# Patient Record
Sex: MALE | Race: BLACK OR AFRICAN AMERICAN | NOT HISPANIC OR LATINO | Employment: FULL TIME | URBAN - METROPOLITAN AREA
[De-identification: names, ages, dates, MRNs, and addresses within clinical notes are randomized per-mention and may not be internally consistent; named-entity substitution may affect disease eponyms.]

---

## 2022-04-19 ENCOUNTER — OFFICE VISIT (OUTPATIENT)
Dept: FAMILY MEDICINE CLINIC | Facility: CLINIC | Age: 47
End: 2022-04-19

## 2022-04-19 VITALS
TEMPERATURE: 98.2 F | SYSTOLIC BLOOD PRESSURE: 144 MMHG | OXYGEN SATURATION: 95 % | HEART RATE: 71 BPM | DIASTOLIC BLOOD PRESSURE: 102 MMHG | WEIGHT: 220.2 LBS | HEIGHT: 67 IN | BODY MASS INDEX: 34.56 KG/M2

## 2022-04-19 DIAGNOSIS — M54.50 LUMBAR PAIN: ICD-10-CM

## 2022-04-19 DIAGNOSIS — Z11.59 ENCOUNTER FOR HEPATITIS C SCREENING TEST FOR LOW RISK PATIENT: ICD-10-CM

## 2022-04-19 DIAGNOSIS — Z00.00 ANNUAL PHYSICAL EXAM: Primary | ICD-10-CM

## 2022-04-19 DIAGNOSIS — R03.0 SINGLE EPISODE OF ELEVATED BLOOD PRESSURE: ICD-10-CM

## 2022-04-19 DIAGNOSIS — E78.2 MIXED HYPERLIPIDEMIA: ICD-10-CM

## 2022-04-19 DIAGNOSIS — R05.9 COUGH: ICD-10-CM

## 2022-04-19 DIAGNOSIS — R53.83 FATIGUE, UNSPECIFIED TYPE: ICD-10-CM

## 2022-04-19 PROCEDURE — 99214 OFFICE O/P EST MOD 30 MIN: CPT | Performed by: NURSE PRACTITIONER

## 2022-04-19 PROCEDURE — 71046 X-RAY EXAM CHEST 2 VIEWS: CPT | Performed by: NURSE PRACTITIONER

## 2022-04-19 PROCEDURE — 72100 X-RAY EXAM L-S SPINE 2/3 VWS: CPT | Performed by: NURSE PRACTITIONER

## 2022-04-19 PROCEDURE — 99386 PREV VISIT NEW AGE 40-64: CPT | Performed by: NURSE PRACTITIONER

## 2022-04-19 RX ORDER — IBUPROFEN 800 MG/1
800 TABLET ORAL EVERY 6 HOURS PRN
Qty: 30 TABLET | Refills: 1 | Status: SHIPPED | OUTPATIENT
Start: 2022-04-19 | End: 2022-11-16 | Stop reason: SDUPTHER

## 2022-04-19 NOTE — PROGRESS NOTES
"Chief Complaint  Back Pain    Subjective          Eddie Landa presents to [facility]  Presents to the office for annual physical exam.  Patient's blood pressure today is elevated 144/102 advised patient to continue to monitor blood pressure.  He denies chest pain shortness of air.  Patient has noticed a nonproductive cough.  Patient denies shortness of air.  Patient reports working on healthy diet and exercise.  Patient has been having lumbar pain spasms noted to mid spine denies radiating to bilateral legs.  Patient is not on any daily medication.      Objective   Vital Signs:   BP (!) 144/102 (BP Location: Left arm, Patient Position: Sitting, Cuff Size: Large Adult)   Pulse 71   Temp 98.2 °F (36.8 °C) (Infrared)   Ht 170.2 cm (67\")   Wt 99.9 kg (220 lb 3.2 oz)   SpO2 95%   BMI 34.49 kg/m²            Physical Exam  Constitutional:       General: He is not in acute distress.     Appearance: Normal appearance. He is not ill-appearing, toxic-appearing or diaphoretic.   HENT:      Head: Normocephalic.      Right Ear: Tympanic membrane and external ear normal. There is no impacted cerumen.      Left Ear: Tympanic membrane and external ear normal. There is no impacted cerumen.      Nose: Nose normal. No congestion or rhinorrhea.      Mouth/Throat:      Mouth: Mucous membranes are moist.      Pharynx: Oropharynx is clear. No oropharyngeal exudate or posterior oropharyngeal erythema.   Eyes:      General:         Right eye: No discharge.         Left eye: No discharge.      Extraocular Movements: Extraocular movements intact.      Conjunctiva/sclera: Conjunctivae normal.      Pupils: Pupils are equal, round, and reactive to light.   Neck:      Vascular: No carotid bruit.   Cardiovascular:      Rate and Rhythm: Normal rate and regular rhythm.      Pulses: Normal pulses.      Heart sounds: Normal heart sounds. No murmur heard.    No friction rub. No gallop.   Pulmonary:      Effort: " Pulmonary effort is normal. No respiratory distress.      Breath sounds: Normal breath sounds. No wheezing, rhonchi or rales.   Chest:      Chest wall: No tenderness.   Abdominal:      General: Abdomen is flat. Bowel sounds are normal. There is no distension.      Palpations: Abdomen is soft. There is no mass.      Tenderness: There is no abdominal tenderness. There is no guarding or rebound.      Hernia: No hernia is present.   Musculoskeletal:         General: No swelling or tenderness. Normal range of motion.      Cervical back: Normal, normal range of motion and neck supple. No spasms or tenderness. Normal range of motion.      Thoracic back: Normal. No spasms or tenderness. Normal range of motion.      Lumbar back: Spasms present. No edema or bony tenderness. No scoliosis.   Skin:     General: Skin is warm and dry.      Coloration: Skin is not jaundiced or pale.      Findings: No erythema or rash.   Neurological:      Mental Status: He is alert and oriented to person, place, and time.      Sensory: No sensory deficit.      Motor: No weakness.      Gait: Gait normal.   Psychiatric:         Mood and Affect: Mood normal.         Behavior: Behavior normal.         Thought Content: Thought content normal.         Judgment: Judgment normal.        Result Review :                 Assessment and Plan    Diagnoses and all orders for this visit:    1. Annual physical exam (Primary)  -     XR Spine Lumbar 2 or 3 View (In Office)  -     CBC & Differential; Future  -     Comprehensive Metabolic Panel; Future  -     Lipid Panel; Future  -     TSH; Future    2. Lumbar pain  Assessment & Plan:  No falls, weakness, or new numbness or tingling. No incontinence.     Lumbar x-ray radiology to read final report Motrin for pain as needed    Orders:  -     XR Spine Lumbar 2 or 3 View (In Office)    3. Encounter for hepatitis C screening test for low risk patient  -     Hepatitis C antibody; Future    4. Mixed  hyperlipidemia  Assessment & Plan:  Diet and exercise discussed with patient check labs    Orders:  -     Lipid Panel; Future    5. Fatigue, unspecified type  Assessment & Plan:  Check thyroid    Orders:  -     CBC & Differential; Future  -     TSH; Future    6. Cough  Assessment & Plan:  Chest x-ray  Radiology to read final report  Start allergy medications, cough syrup and Mucinex      Orders:  -     XR Chest PA & Lateral    7. Single episode of elevated blood pressure  Assessment & Plan:  Stressed the importance for patient to monitor his blood pressure due to elevated reading of 144/102 today.  Patient denies chest pain shortness of air.  Advised patient to follow-up in 1 week for a blood pressure check.      Other orders  -     ibuprofen (ADVIL,MOTRIN) 800 MG tablet; Take 1 tablet by mouth Every 6 (Six) Hours As Needed for Mild Pain .  Dispense: 30 tablet; Refill: 1  Counseling was provided on nutrition, physical activity, development, and injury prevention, dental health, and safe sex practices patient verbalizes understanding no additional questions were asked.      Follow Up   No follow-ups on file.  Patient was given instructions and counseling regarding his condition or for health maintenance advice. Please see specific information pulled into the AVS if appropriate.

## 2022-04-20 PROBLEM — R03.0 SINGLE EPISODE OF ELEVATED BLOOD PRESSURE: Status: ACTIVE | Noted: 2022-04-20

## 2022-04-20 NOTE — ASSESSMENT & PLAN NOTE
Stressed the importance for patient to monitor his blood pressure due to elevated reading of 144/102 today.  Patient denies chest pain shortness of air.  Advised patient to follow-up in 1 week for a blood pressure check.

## 2022-04-20 NOTE — ASSESSMENT & PLAN NOTE
No falls, weakness, or new numbness or tingling. No incontinence.     Lumbar x-ray radiology to read final report Motrin for pain as needed

## 2022-04-22 ENCOUNTER — LAB (OUTPATIENT)
Dept: FAMILY MEDICINE CLINIC | Facility: CLINIC | Age: 47
End: 2022-04-22

## 2022-04-22 DIAGNOSIS — Z00.00 ANNUAL PHYSICAL EXAM: ICD-10-CM

## 2022-04-22 DIAGNOSIS — E78.2 MIXED HYPERLIPIDEMIA: ICD-10-CM

## 2022-04-22 DIAGNOSIS — Z11.59 ENCOUNTER FOR HEPATITIS C SCREENING TEST FOR LOW RISK PATIENT: ICD-10-CM

## 2022-04-22 DIAGNOSIS — R53.83 FATIGUE, UNSPECIFIED TYPE: ICD-10-CM

## 2022-04-22 LAB
ALBUMIN SERPL-MCNC: 4.8 G/DL (ref 3.5–5.2)
ALBUMIN/GLOB SERPL: 1.7 G/DL
ALP SERPL-CCNC: 83 U/L (ref 39–117)
ALT SERPL W P-5'-P-CCNC: 24 U/L (ref 1–41)
ANION GAP SERPL CALCULATED.3IONS-SCNC: 8 MMOL/L (ref 5–15)
AST SERPL-CCNC: 18 U/L (ref 1–40)
BILIRUB SERPL-MCNC: 0.5 MG/DL (ref 0–1.2)
BUN SERPL-MCNC: 14 MG/DL (ref 6–20)
BUN/CREAT SERPL: 11 (ref 7–25)
CALCIUM SPEC-SCNC: 9.5 MG/DL (ref 8.6–10.5)
CHLORIDE SERPL-SCNC: 101 MMOL/L (ref 98–107)
CHOLEST SERPL-MCNC: 172 MG/DL (ref 0–200)
CO2 SERPL-SCNC: 30 MMOL/L (ref 22–29)
CREAT SERPL-MCNC: 1.27 MG/DL (ref 0.76–1.27)
EGFRCR SERPLBLD CKD-EPI 2021: 70.6 ML/MIN/1.73
ERYTHROCYTE [DISTWIDTH] IN BLOOD BY AUTOMATED COUNT: 13.4 % (ref 12.3–15.4)
GLOBULIN UR ELPH-MCNC: 2.9 GM/DL
GLUCOSE SERPL-MCNC: 72 MG/DL (ref 65–99)
HCT VFR BLD AUTO: 39.9 % (ref 37.5–51)
HCV AB SER DONR QL: NORMAL
HDLC SERPL-MCNC: 39 MG/DL (ref 40–60)
HGB BLD-MCNC: 13.1 G/DL (ref 13–17.7)
LDLC SERPL CALC-MCNC: 117 MG/DL (ref 0–100)
LDLC/HDLC SERPL: 2.98 {RATIO}
LYMPHOCYTES # BLD AUTO: 2.9 10*3/MM3 (ref 0.7–3.1)
LYMPHOCYTES NFR BLD AUTO: 49.1 % (ref 19.6–45.3)
MCH RBC QN AUTO: 30.6 PG (ref 26.6–33)
MCHC RBC AUTO-ENTMCNC: 33 G/DL (ref 31.5–35.7)
MCV RBC AUTO: 92.8 FL (ref 79–97)
MONOCYTES # BLD AUTO: 0.4 10*3/MM3 (ref 0.1–0.9)
MONOCYTES NFR BLD AUTO: 5.9 % (ref 5–12)
NEUTROPHILS NFR BLD AUTO: 2.7 10*3/MM3 (ref 1.7–7)
NEUTROPHILS NFR BLD AUTO: 45 % (ref 42.7–76)
PLATELET # BLD AUTO: 340 10*3/MM3 (ref 140–450)
PMV BLD AUTO: 7.6 FL (ref 6–12)
POTASSIUM SERPL-SCNC: 4 MMOL/L (ref 3.5–5.2)
PROT SERPL-MCNC: 7.7 G/DL (ref 6–8.5)
RBC # BLD AUTO: 4.3 10*6/MM3 (ref 4.14–5.8)
SODIUM SERPL-SCNC: 139 MMOL/L (ref 136–145)
TRIGL SERPL-MCNC: 83 MG/DL (ref 0–150)
TSH SERPL DL<=0.05 MIU/L-ACNC: 0.76 UIU/ML (ref 0.27–4.2)
VLDLC SERPL-MCNC: 16 MG/DL (ref 5–40)
WBC NRBC COR # BLD: 6 10*3/MM3 (ref 3.4–10.8)

## 2022-04-22 PROCEDURE — 86803 HEPATITIS C AB TEST: CPT | Performed by: NURSE PRACTITIONER

## 2022-04-22 PROCEDURE — 80061 LIPID PANEL: CPT | Performed by: NURSE PRACTITIONER

## 2022-04-22 PROCEDURE — 36415 COLL VENOUS BLD VENIPUNCTURE: CPT | Performed by: NURSE PRACTITIONER

## 2022-04-22 PROCEDURE — 80050 GENERAL HEALTH PANEL: CPT | Performed by: NURSE PRACTITIONER

## 2022-05-09 RX ORDER — IBUPROFEN 800 MG/1
TABLET ORAL
Qty: 30 TABLET | Refills: 1 | OUTPATIENT
Start: 2022-05-09

## 2022-11-16 ENCOUNTER — OFFICE VISIT (OUTPATIENT)
Dept: FAMILY MEDICINE CLINIC | Facility: CLINIC | Age: 47
End: 2022-11-16

## 2022-11-16 VITALS
HEART RATE: 80 BPM | BODY MASS INDEX: 35.28 KG/M2 | TEMPERATURE: 98.6 F | HEIGHT: 67 IN | OXYGEN SATURATION: 98 % | SYSTOLIC BLOOD PRESSURE: 168 MMHG | WEIGHT: 224.8 LBS | DIASTOLIC BLOOD PRESSURE: 102 MMHG

## 2022-11-16 DIAGNOSIS — R06.83 LOUD SNORING: ICD-10-CM

## 2022-11-16 DIAGNOSIS — I10 PRIMARY HYPERTENSION: ICD-10-CM

## 2022-11-16 DIAGNOSIS — M25.511 ACUTE PAIN OF RIGHT SHOULDER: Primary | ICD-10-CM

## 2022-11-16 DIAGNOSIS — M54.50 LUMBAR PAIN: ICD-10-CM

## 2022-11-16 DIAGNOSIS — M79.641 RIGHT HAND PAIN: ICD-10-CM

## 2022-11-16 PROBLEM — R03.0 SINGLE EPISODE OF ELEVATED BLOOD PRESSURE: Status: RESOLVED | Noted: 2022-04-20 | Resolved: 2022-11-16

## 2022-11-16 PROCEDURE — 99214 OFFICE O/P EST MOD 30 MIN: CPT | Performed by: NURSE PRACTITIONER

## 2022-11-16 RX ORDER — AMLODIPINE BESYLATE 5 MG/1
5 TABLET ORAL DAILY
Qty: 30 TABLET | Refills: 0 | Status: SHIPPED | OUTPATIENT
Start: 2022-11-16 | End: 2022-12-29 | Stop reason: SDUPTHER

## 2022-11-16 RX ORDER — IBUPROFEN 800 MG/1
800 TABLET ORAL EVERY 6 HOURS PRN
Qty: 30 TABLET | Refills: 1 | Status: SHIPPED | OUTPATIENT
Start: 2022-11-16

## 2022-11-16 RX ORDER — ACETAMINOPHEN 500 MG
500 TABLET ORAL EVERY 6 HOURS PRN
COMMUNITY

## 2022-11-16 NOTE — ASSESSMENT & PLAN NOTE
BP elevated.  Start amlodipine.  Side effects of all new and old medications reviewed with the patient -willing to accept all risks involved.  Advised to rto if no improvement or worsening of symptoms.  Patient instructed to  clinical summary at .   Patient to keep BP log return to office with readings.

## 2022-11-16 NOTE — PROGRESS NOTES
"Chief Complaint  Shoulder Pain (right), Hand Pain (right), Back Pain (SHARP PAIN MID LEVEL AND LOWER BACK), and SLEEP STUDY REFERRAL    Subjective        Eddie Landa presents to [facility]  History of Present Illness  Patient presents to the office today with a 2-month complaint of right shoulder pain.  He has a history of a right hip fracture and is having increased pain in the right hand.  He denies chest pain shortness of air.  He has chronic back pain.  He is taking Tylenol.  He is out of his prescription ibuprofen I will refill this.  Patient denies any known injury.  Patient reports he does a lot of wear and tear on his joints by working and lifting and increased range of motion.  Blood pressure is 160/102.  Patient is to keep a BP record and return to office with readings.  Patient has had witnessed episodes of loud snoring.              Objective   Vital Signs:  BP (!) 168/102 (BP Location: Left arm, Patient Position: Sitting, Cuff Size: Adult)   Pulse 80   Temp 98.6 °F (37 °C)   Ht 170.2 cm (67\")   Wt 102 kg (224 lb 12.8 oz)   SpO2 98%   BMI 35.21 kg/m²   Estimated body mass index is 35.21 kg/m² as calculated from the following:    Height as of this encounter: 170.2 cm (67\").    Weight as of this encounter: 102 kg (224 lb 12.8 oz).    Class 2 Severe Obesity (BMI >=35 and <=39.9). Obesity-related health conditions include the following: hypertension. Obesity is unchanged. BMI is is above average; BMI management plan is completed. We discussed portion control and increasing exercise.      Physical Exam  Constitutional:       General: He is not in acute distress.     Appearance: Normal appearance.   HENT:      Head: Normocephalic.   Eyes:      Pupils: Pupils are equal, round, and reactive to light.   Cardiovascular:      Rate and Rhythm: Normal rate and regular rhythm.      Pulses: Normal pulses.      Heart sounds: Normal heart sounds.   Pulmonary:      Effort: Pulmonary " effort is normal. No respiratory distress.      Breath sounds: Normal breath sounds. No wheezing.   Abdominal:      General: Abdomen is flat.      Palpations: Abdomen is soft. There is no mass.      Tenderness: There is no abdominal tenderness.      Hernia: No hernia is present.   Musculoskeletal:         General: Tenderness present.      Right shoulder: Tenderness present. Decreased range of motion.      Right hand: Tenderness present.      Cervical back: Normal, normal range of motion and neck supple. No spasms or tenderness. Normal range of motion.      Thoracic back: Normal. No spasms or tenderness. Normal range of motion.      Lumbar back: Spasms and tenderness present. No edema or bony tenderness. Decreased range of motion. No scoliosis.   Skin:     General: Skin is warm.   Neurological:      General: No focal deficit present.      Mental Status: He is alert and oriented to person, place, and time.   Psychiatric:         Mood and Affect: Mood normal.         Behavior: Behavior normal.         Thought Content: Thought content normal.         Judgment: Judgment normal.        Result Review :{Labs  Result Review  Imaging  Med Tab  Media  Procedures  :23}  The following data was reviewed by: MARY Sorensen on 11/16/2022:  Common labs    Common Labs 4/22/22 4/22/22 4/22/22    1451 1451 1451   Glucose  72    BUN  14    Creatinine  1.27    Sodium  139    Potassium  4.0    Chloride  101    Calcium  9.5    Albumin  4.80    Total Bilirubin  0.5    Alkaline Phosphatase  83    AST (SGOT)  18    ALT (SGPT)  24    WBC 6.00     Hemoglobin 13.1     Hematocrit 39.9     Platelets 340     Total Cholesterol   172   Triglycerides   83   HDL Cholesterol   39 (A)   LDL Cholesterol    117 (A)   (A) Abnormal value            Data reviewed: Radiologic studies lumbar / chest           Assessment and Plan   Diagnoses and all orders for this visit:    1. Acute pain of right shoulder (Primary)  Assessment & Plan:  Check  x-ray.  Patient to alternate Tylenol ibuprofen for pain.    Orders:  -     Ambulatory Referral to Sleep Medicine  -     ibuprofen (ADVIL,MOTRIN) 800 MG tablet; Take 1 tablet by mouth Every 6 (Six) Hours As Needed for Mild Pain.  Dispense: 30 tablet; Refill: 1  -     XR Shoulder 2+ View Right; Future    2. Lumbar pain    3. Right hand pain    4. Loud snoring  -     Ambulatory Referral to Sleep Medicine    5. Primary hypertension  Assessment & Plan:  BP elevated.  Start amlodipine.  Side effects of all new and old medications reviewed with the patient -willing to accept all risks involved.  Advised to rto if no improvement or worsening of symptoms.  Patient instructed to  clinical summary at .   Patient to keep BP log return to office with readings.    Orders:  -     amLODIPine (NORVASC) 5 MG tablet; Take 1 tablet by mouth Daily. Keep blood pressure log return to office with readings  Dispense: 30 tablet; Refill: 0    Lumbar pain is chronic.  Patient to alternate Tylenol ibuprofen and use heating pad for comfort.    Right hand pain continue to take Motrin Tylenol for discomfort.  Discussed with patient wearing a brace can sometimes help with the pain/inflammation.         I spent 30 minutes caring for Eddie on this date of service. This time includes time spent by me in the following activities:preparing for the visit, reviewing tests, obtaining and/or reviewing a separately obtained history, performing a medically appropriate examination and/or evaluation , counseling and educating the patient/family/caregiver, ordering medications, tests, or procedures, documenting information in the medical record, independently interpreting results and communicating that information with the patient/family/caregiver and care coordination  Follow Up {Instructions Charge Capture  Follow-up Communications :23}  Return in about 5 months (around 4/24/2023) for Annual physical.  Patient was given instructions and  counseling regarding his condition or for health maintenance advice. Please see specific information pulled into the AVS if appropriate.

## 2022-11-17 ENCOUNTER — HOSPITAL ENCOUNTER (OUTPATIENT)
Dept: GENERAL RADIOLOGY | Facility: HOSPITAL | Age: 47
Discharge: HOME | End: 2022-11-17
Admitting: NURSE PRACTITIONER

## 2022-11-17 DIAGNOSIS — M25.511 ACUTE PAIN OF RIGHT SHOULDER: ICD-10-CM

## 2022-11-17 PROCEDURE — 73030 X-RAY EXAM OF SHOULDER: CPT

## 2022-11-21 DIAGNOSIS — M25.511 ACUTE PAIN OF RIGHT SHOULDER: Primary | ICD-10-CM

## 2022-11-22 ENCOUNTER — TELEPHONE (OUTPATIENT)
Dept: SPORTS MEDICINE | Facility: CLINIC | Age: 47
End: 2022-11-22

## 2022-11-22 NOTE — TELEPHONE ENCOUNTER
Received message from sapna (Romina POLANCO) to contact pt for appointment w/Dr Castillo. I called pt, left vm to contact our office to schedule appointment.  HUB ok to schedule if/when pt calls back.

## 2022-12-29 DIAGNOSIS — I10 PRIMARY HYPERTENSION: ICD-10-CM

## 2022-12-29 RX ORDER — AMLODIPINE BESYLATE 5 MG/1
5 TABLET ORAL DAILY
Qty: 30 TABLET | Refills: 0 | Status: SHIPPED | OUTPATIENT
Start: 2022-12-29 | End: 2023-02-03 | Stop reason: SDUPTHER

## 2022-12-29 NOTE — TELEPHONE ENCOUNTER
Caller: BROOKLYN SONGA    Relationship: Spouse    Best call back number: [phone]    Requested Prescriptions:   Requested Prescriptions     Pending Prescriptions Disp Refills   • amLODIPine (NORVASC) 5 MG tablet 30 tablet 0     Sig: Take 1 tablet by mouth Daily. Keep blood pressure log return to office with readings        Pharmacy where request should be sent: [id number] [place] [address] AT [facility] [phone] [facility] [phone] FX     Additional details provided by patient: PATIENT COMPLETELY OUT OF MEDICATION. PATIENT'S SPOUSE ASK FOR A CALL BACK IF PATIENT NEEDS TO SCHEDULE AN APPOINTMENT.    Does the patient have less than a 3 day supply:  [x] Yes  [] No    Would you like a call back once the refill request has been completed: [x] Yes [] No    If the office needs to give you a call back, can they leave a voicemail: [x] Yes [] No    Blas Valdez   12/29/22 09:26 EST

## 2023-02-03 ENCOUNTER — OFFICE VISIT (OUTPATIENT)
Dept: FAMILY MEDICINE CLINIC | Facility: CLINIC | Age: 48
End: 2023-02-03
Payer: COMMERCIAL

## 2023-02-03 VITALS
DIASTOLIC BLOOD PRESSURE: 84 MMHG | OXYGEN SATURATION: 99 % | HEART RATE: 82 BPM | SYSTOLIC BLOOD PRESSURE: 130 MMHG | HEIGHT: 67 IN | TEMPERATURE: 98.4 F | BODY MASS INDEX: 35.53 KG/M2 | WEIGHT: 226.4 LBS

## 2023-02-03 DIAGNOSIS — Z01.30 BP CHECK: ICD-10-CM

## 2023-02-03 DIAGNOSIS — I10 ESSENTIAL HYPERTENSION: Primary | ICD-10-CM

## 2023-02-03 PROCEDURE — 99213 OFFICE O/P EST LOW 20 MIN: CPT | Performed by: NURSE PRACTITIONER

## 2023-02-03 RX ORDER — AMLODIPINE BESYLATE 5 MG/1
5 TABLET ORAL DAILY
Qty: 90 TABLET | Refills: 1 | Status: SHIPPED | OUTPATIENT
Start: 2023-02-03 | End: 2023-02-04

## 2023-02-03 NOTE — PROGRESS NOTES
"Chief Complaint  Hypertension    Subjective        Eddie Landa presents to [facility]  History of Present Illness   48 year old male, pt of Paradise Garcia, new to me, presenting for follow-up and medication refills. During his visit with Paradise on 11/16/22 his BP was elevated at 168/102, he was asymptomatic, she started Amlodipine 5 mg daily, he was informed to track BP daily keep log and bring follow-up appointment. Today his BP is better controlled at 130/84, he is tolerating medication well, will continue Amlodipine 5 mg daily. He denies CP, SOA, HA, dizziness or LE edema.     Objective   Vital Signs:  /84   Pulse 82   Temp 98.4 °F (36.9 °C)   Ht 170.2 cm (67\")   Wt 103 kg (226 lb 6.4 oz)   SpO2 99%   BMI 35.46 kg/m²   Estimated body mass index is 35.46 kg/m² as calculated from the following:    Height as of this encounter: 170.2 cm (67\").    Weight as of this encounter: 103 kg (226 lb 6.4 oz).             Physical Exam  Cardiovascular:      Rate and Rhythm: Normal rate.      Pulses: Normal pulses.      Heart sounds: Normal heart sounds.   Pulmonary:      Effort: Pulmonary effort is normal.      Breath sounds: Normal breath sounds.   Neurological:      General: No focal deficit present.      Mental Status: He is alert and oriented to person, place, and time.   Psychiatric:         Mood and Affect: Mood normal.         Behavior: Behavior normal.        Result Review :                   Assessment and Plan   Diagnoses and all orders for this visit:    1. Essential hypertension (Primary)  -     Discontinue: amLODIPine (NORVASC) 5 MG tablet; Take 1 tablet by mouth Daily. Keep blood pressure log return to office with readings  Dispense: 90 tablet; Refill: 1  -     amLODIPine (NORVASC) 5 MG tablet; Take 1 tablet by mouth Daily.  Dispense: 90 tablet; Refill: 1    2. BP check  Comments:  Continue amlodipine 5 mg daily, BP goal <130/80.              Follow Up   Return in " about 2 months (around 4/3/2023) for Annual physical.  Patient was given instructions and counseling regarding his condition or for health maintenance advice. Please see specific information pulled into the AVS if appropriate.     Schedule an appointment with Paradsie for physical and come fasting.     Mask and Gloves worn

## 2023-02-04 RX ORDER — AMLODIPINE BESYLATE 5 MG/1
5 TABLET ORAL DAILY
Qty: 90 TABLET | Refills: 1 | Status: SHIPPED | OUTPATIENT
Start: 2023-02-04

## 2023-04-24 ENCOUNTER — OFFICE VISIT (OUTPATIENT)
Dept: FAMILY MEDICINE CLINIC | Facility: CLINIC | Age: 48
End: 2023-04-24
Payer: COMMERCIAL

## 2023-04-24 VITALS
SYSTOLIC BLOOD PRESSURE: 130 MMHG | DIASTOLIC BLOOD PRESSURE: 96 MMHG | WEIGHT: 227.4 LBS | TEMPERATURE: 98.6 F | HEIGHT: 67 IN | BODY MASS INDEX: 35.69 KG/M2 | HEART RATE: 75 BPM | OXYGEN SATURATION: 97 %

## 2023-04-24 DIAGNOSIS — Z00.00 ANNUAL PHYSICAL EXAM: Primary | ICD-10-CM

## 2023-04-24 DIAGNOSIS — R53.83 FATIGUE, UNSPECIFIED TYPE: ICD-10-CM

## 2023-04-24 DIAGNOSIS — Z12.5 SCREENING PSA (PROSTATE SPECIFIC ANTIGEN): ICD-10-CM

## 2023-04-24 DIAGNOSIS — I10 PRIMARY HYPERTENSION: ICD-10-CM

## 2023-04-24 DIAGNOSIS — E78.2 MIXED HYPERLIPIDEMIA: ICD-10-CM

## 2023-04-24 NOTE — PROGRESS NOTES
"Chief Complaint  Annual Exam    Subjective         Eddie Landa presents to [facility]  History of Present Illness  Patient presents to the office for an annual visit. Patient blood pressure is BP: 130/96 (04/24/23 1457).  Patient denies chest pain / shortness of air. Patient is without acute pain or distress at time of dictation.   Patient does not smoke.  EtOH: None  No drug abuse  Diet: regular  Exercise: regularly   Family history of cancer lung cancer mother, brother destiny          Objective    Vital Signs:  /96 (BP Location: Left arm, Patient Position: Sitting, Cuff Size: Adult)   Pulse 75   Temp 98.6 °F (37 °C)   Ht 170.2 cm (67\")   Wt 103 kg (227 lb 6.4 oz)   SpO2 97%   BMI 35.62 kg/m²   Estimated body mass index is 35.62 kg/m² as calculated from the following:    Height as of this encounter: 170.2 cm (67\").    Weight as of this encounter: 103 kg (227 lb 6.4 oz).       Class 2 Severe Obesity (BMI >=35 and <=39.9). Obesity-related health conditions include the following: hypertension. Obesity is improving with treatment. BMI is is above average; BMI management plan is completed. We discussed portion control and increasing exercise.      Physical Exam  Constitutional:       General: He is not in acute distress.     Appearance: Normal appearance.   HENT:      Head: Normocephalic.      Right Ear: Tympanic membrane normal.      Left Ear: Tympanic membrane normal.      Nose: Nose normal.   Eyes:      Pupils: Pupils are equal, round, and reactive to light.   Cardiovascular:      Rate and Rhythm: Normal rate.      Pulses: Normal pulses.      Heart sounds: Normal heart sounds.   Pulmonary:      Effort: Pulmonary effort is normal.      Breath sounds: Normal breath sounds.   Abdominal:      General: Bowel sounds are normal.      Palpations: Abdomen is soft.   Musculoskeletal:         General: Normal range of motion.      Cervical back: Normal range of motion and neck supple. "   Skin:     General: Skin is warm.   Neurological:      General: No focal deficit present.      Mental Status: He is alert and oriented to person, place, and time.   Psychiatric:         Mood and Affect: Mood normal.         Behavior: Behavior normal.         Thought Content: Thought content normal.         Judgment: Judgment normal.        Result Review :  The following data was reviewed by: MARY Sorensen on 04/24/2023:  Common labs        4/24/2023    15:25   Common Labs   Glucose 91     BUN 17     Creatinine 1.15     Sodium 140     Potassium 4.3     Chloride 103     Calcium 9.2     Total Protein 7.2     Albumin 4.4     Total Bilirubin <0.2     Alkaline Phosphatase 93     AST (SGOT) 20     ALT (SGPT) 19     WBC 7.1     Hemoglobin 13.7     Hematocrit 39.8     Platelets 346     Total Cholesterol 187     Triglycerides 230     HDL Cholesterol 37     LDL Cholesterol  110     PSA 0.4       Data reviewed: Radiologic studies chest xray              Assessment and Plan   Diagnoses and all orders for this visit:    1. Annual physical exam (Primary)  -     CBC & Differential  -     Comprehensive Metabolic Panel  -     Lipid Panel  -     TSH  -     PSA Screen    2. Screening PSA (prostate specific antigen)  -     PSA Screen    3. Mixed hyperlipidemia  -     Lipid Panel    4. Primary hypertension  -     Comprehensive Metabolic Panel    5. Fatigue, unspecified type  -     CBC & Differential  -     TSH     Counseling was provided on nutrition, physical activity, development, and injury prevention, dental health, and safe sex practices patient verbalizes understanding no additional questions were asked.          I spent 30 minutes caring for Eddie on this date of service. This time includes time spent by me in the following activities:preparing for the visit, reviewing tests, obtaining and/or reviewing a separately obtained history, performing a medically appropriate examination and/or evaluation , counseling and  educating the patient/family/caregiver, ordering medications, tests, or procedures, documenting information in the medical record, independently interpreting results and communicating that information with the patient/family/caregiver and care coordination  Follow Up   Return in about 6 months (around 10/24/2023) for Recheck.  Patient was given instructions and counseling regarding his condition or for health maintenance advice. Please see specific information pulled into the AVS if appropriate.

## 2023-04-25 LAB
ALBUMIN SERPL-MCNC: 4.4 G/DL (ref 4–5)
ALBUMIN/GLOB SERPL: 1.6 {RATIO} (ref 1.2–2.2)
ALP SERPL-CCNC: 93 IU/L (ref 44–121)
ALT SERPL-CCNC: 19 IU/L (ref 0–44)
AST SERPL-CCNC: 20 IU/L (ref 0–40)
BASOPHILS # BLD AUTO: 0 X10E3/UL (ref 0–0.2)
BASOPHILS NFR BLD AUTO: 0 %
BILIRUB SERPL-MCNC: <0.2 MG/DL (ref 0–1.2)
BUN SERPL-MCNC: 17 MG/DL (ref 6–24)
BUN/CREAT SERPL: 15 (ref 9–20)
CALCIUM SERPL-MCNC: 9.2 MG/DL (ref 8.7–10.2)
CHLORIDE SERPL-SCNC: 103 MMOL/L (ref 96–106)
CHOLEST SERPL-MCNC: 187 MG/DL (ref 100–199)
CO2 SERPL-SCNC: 23 MMOL/L (ref 20–29)
CREAT SERPL-MCNC: 1.15 MG/DL (ref 0.76–1.27)
EGFRCR SERPLBLD CKD-EPI 2021: 79 ML/MIN/1.73
EOSINOPHIL # BLD AUTO: 0.1 X10E3/UL (ref 0–0.4)
EOSINOPHIL NFR BLD AUTO: 2 %
ERYTHROCYTE [DISTWIDTH] IN BLOOD BY AUTOMATED COUNT: 12.7 % (ref 11.6–15.4)
GLOBULIN SER CALC-MCNC: 2.8 G/DL (ref 1.5–4.5)
GLUCOSE SERPL-MCNC: 91 MG/DL (ref 70–99)
HCT VFR BLD AUTO: 39.8 % (ref 37.5–51)
HDLC SERPL-MCNC: 37 MG/DL
HGB BLD-MCNC: 13.7 G/DL (ref 13–17.7)
IMM GRANULOCYTES # BLD AUTO: 0 X10E3/UL (ref 0–0.1)
IMM GRANULOCYTES NFR BLD AUTO: 0 %
LDLC SERPL CALC-MCNC: 110 MG/DL (ref 0–99)
LYMPHOCYTES # BLD AUTO: 2.9 X10E3/UL (ref 0.7–3.1)
LYMPHOCYTES NFR BLD AUTO: 41 %
MCH RBC QN AUTO: 30.8 PG (ref 26.6–33)
MCHC RBC AUTO-ENTMCNC: 34.4 G/DL (ref 31.5–35.7)
MCV RBC AUTO: 89 FL (ref 79–97)
MONOCYTES # BLD AUTO: 0.4 X10E3/UL (ref 0.1–0.9)
MONOCYTES NFR BLD AUTO: 6 %
NEUTROPHILS # BLD AUTO: 3.6 X10E3/UL (ref 1.4–7)
NEUTROPHILS NFR BLD AUTO: 51 %
PLATELET # BLD AUTO: 346 X10E3/UL (ref 150–450)
POTASSIUM SERPL-SCNC: 4.3 MMOL/L (ref 3.5–5.2)
PROT SERPL-MCNC: 7.2 G/DL (ref 6–8.5)
PSA SERPL-MCNC: 0.4 NG/ML (ref 0–4)
RBC # BLD AUTO: 4.45 X10E6/UL (ref 4.14–5.8)
SODIUM SERPL-SCNC: 140 MMOL/L (ref 134–144)
TRIGL SERPL-MCNC: 230 MG/DL (ref 0–149)
TSH SERPL DL<=0.005 MIU/L-ACNC: 1.17 UIU/ML (ref 0.45–4.5)
VLDLC SERPL CALC-MCNC: 40 MG/DL (ref 5–40)
WBC # BLD AUTO: 7.1 X10E3/UL (ref 3.4–10.8)

## 2023-05-03 DIAGNOSIS — E78.2 MIXED HYPERLIPIDEMIA: Primary | ICD-10-CM

## 2023-05-03 RX ORDER — ATORVASTATIN CALCIUM 10 MG/1
10 TABLET, FILM COATED ORAL DAILY
Qty: 30 TABLET | Refills: 1 | Status: SHIPPED | OUTPATIENT
Start: 2023-05-03

## 2023-08-08 ENCOUNTER — OFFICE VISIT (OUTPATIENT)
Dept: FAMILY MEDICINE CLINIC | Facility: CLINIC | Age: 48
End: 2023-08-08
Payer: COMMERCIAL

## 2023-08-08 VITALS
SYSTOLIC BLOOD PRESSURE: 140 MMHG | WEIGHT: 225 LBS | BODY MASS INDEX: 35.31 KG/M2 | HEART RATE: 76 BPM | HEIGHT: 67 IN | DIASTOLIC BLOOD PRESSURE: 80 MMHG | OXYGEN SATURATION: 98 % | RESPIRATION RATE: 18 BRPM | TEMPERATURE: 98.4 F

## 2023-08-08 DIAGNOSIS — R06.83 LOUD SNORING: ICD-10-CM

## 2023-08-08 DIAGNOSIS — M54.2 NECK PAIN: ICD-10-CM

## 2023-08-08 DIAGNOSIS — I10 PRIMARY HYPERTENSION: Chronic | ICD-10-CM

## 2023-08-08 DIAGNOSIS — M25.512 LEFT SHOULDER PAIN, UNSPECIFIED CHRONICITY: Primary | ICD-10-CM

## 2023-08-08 DIAGNOSIS — E78.2 MIXED HYPERLIPIDEMIA: Chronic | ICD-10-CM

## 2023-08-08 DIAGNOSIS — M19.011 PRIMARY OSTEOARTHRITIS OF RIGHT SHOULDER: ICD-10-CM

## 2023-08-08 PROBLEM — M25.511 ACUTE PAIN OF RIGHT SHOULDER: Status: RESOLVED | Noted: 2022-11-16 | Resolved: 2023-08-08

## 2023-08-08 PROCEDURE — 99214 OFFICE O/P EST MOD 30 MIN: CPT | Performed by: STUDENT IN AN ORGANIZED HEALTH CARE EDUCATION/TRAINING PROGRAM

## 2023-08-08 RX ORDER — ACETAMINOPHEN 500 MG
500 TABLET ORAL EVERY 6 HOURS PRN
Qty: 90 TABLET | Refills: 2 | Status: SHIPPED | OUTPATIENT
Start: 2023-08-08 | End: 2023-11-06

## 2023-08-08 RX ORDER — BACLOFEN 10 MG/1
10 TABLET ORAL NIGHTLY PRN
Qty: 30 TABLET | Refills: 2 | Status: SHIPPED | OUTPATIENT
Start: 2023-08-08 | End: 2023-11-06

## 2023-08-08 NOTE — ASSESSMENT & PLAN NOTE
Hypertension is unchanged.  Continue current treatment regimen.  Dietary sodium restriction.  Continue current medications.  Blood pressure will be reassessed at the next regular appointment.

## 2023-08-08 NOTE — ASSESSMENT & PLAN NOTE
Lipid abnormalities are unchanged.  Nutritional counseling was provided. and Pharmacotherapy as ordered.  Lipids will be reassessed in 6 months.    Discussed the importance of healthy diet, nutrition, and lifestyle. Recommend low salt, fat/cholesterol diet and avoid concentrated sweets. Encouraged DASH diet along with fresh fruits & vegetables and low fat dairy products. Counseled patient to exercise/walk as tolerated. Avoid tobacco and alcohol use.

## 2023-08-08 NOTE — PROGRESS NOTES
"Chief Complaint  shoulder, neck and arm pain (Been taking Tylenol twice per day for 1.5 months noticed swollen area hurts to turn neck to left side)    Subjective        Eddie Landa presents to [facility]  History of Present Illness  49yo male Patient was previously seen by PCP at [facility] patient usually follows Paradise Garcia nurse practitioner, however patient is new to me and is here for establishment of care visit for chronic medical conditions including hypertension, hyperlipidemia.    Chart review indicates from the past several visits patient has been having an issue with hypertension and nurse practitioner Paradise Garcia started patient on amlodipine and on last visit blood pressure was within normal limits.  Imaging review indicates patient had a negative chest x-ray and negative hand and forearm x-ray.  On xray patient has osteoarthritis of the right shoulder especially moderate at the right AC joint.    Patient labs from April 2023 indicate patient had borderline elevation of triglyceride and was also started on statin however patient's CBC CMP prostate level were within normal limits.      Pt c/o pain in left shoulder along AC joint x 1 month. Patient also c/o neck pain going on for a few months as well. 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  Patient states he has been taking Tylenol for the shoulder and neck pain.    Pt c/o neck pain with left lateral ROM at neck.  Patient states his blood pressure at home has never been greater than 140 systolic.  Patient states he still snores loudly and is agreeable to sleep study referral.      Objective   Vital Signs:  /80 (BP Location: Left arm, Patient Position: Sitting, Cuff Size: Large Adult)   Pulse 76   Temp 98.4 øF (36.9 øC) (Infrared)   Resp 18   Ht 170.2 cm (67\")   Wt 102 kg (225 lb)  " " SpO2 98%   BMI 35.24 kg/mý   Estimated body mass index is 35.24 kg/mý as calculated from the following:    Height as of this encounter: 170.2 cm (67\").    Weight as of this encounter: 102 kg (225 lb).               Physical Exam  Constitutional:       Appearance: Normal appearance.   HENT:      Head: Normocephalic and atraumatic.   Eyes:      Conjunctiva/sclera: Conjunctivae normal.   Cardiovascular:      Rate and Rhythm: Normal rate and regular rhythm.      Heart sounds: Normal heart sounds.   Pulmonary:      Effort: Pulmonary effort is normal.      Breath sounds: Normal breath sounds.   Abdominal:      General: Bowel sounds are normal.      Palpations: Abdomen is soft.      Comments: Non-tender   Musculoskeletal:      Comments: neck pain with left lateral ROM at neck.  +left shoulder pain with ROM.  Pt had a muscular knot on palpation along left mid clavicular area.   Skin:     General: Skin is warm.   Neurological:      General: No focal deficit present.      Mental Status: He is alert and oriented to person, place, and time.   Psychiatric:         Mood and Affect: Mood normal.         Behavior: Behavior normal.      Result Review :  The following data was reviewed by: MARY Varela on 08/08/2023:  Common labs          4/24/2023    15:25   Common Labs   Glucose 91    BUN 17    Creatinine 1.15    Sodium 140    Potassium 4.3    Chloride 103    Calcium 9.2    Total Protein 7.2    Albumin 4.4    Total Bilirubin <0.2    Alkaline Phosphatase 93    AST (SGOT) 20    ALT (SGPT) 19    WBC 7.1    Hemoglobin 13.7    Hematocrit 39.8    Platelets 346    Total Cholesterol 187    Triglycerides 230    HDL Cholesterol 37    LDL Cholesterol  110    PSA 0.4      Data reviewed : Radiologic studies xray negative chest x-ray and negative hand and forearm x-ray.  On xray patient has osteoarthritis of the right shoulder especially moderate at the right AC joint.             Assessment and Plan   Diagnoses and all " orders for this visit:    1. Left shoulder pain, unspecified chronicity (Primary)  Assessment & Plan:  Counseled patient on avoiding excessive bending, lifting, or using the shoulder aggressively until further evaluation and treatment of shoulder and neck pain.  Patient voiced understanding.  Avoid NSAID use due to history of hypertension.  Tylenol as needed for pain.    Orders:  -     Ambulatory Referral to Orthopedic Surgery  -     Ambulatory Referral to Physical Therapy Evaluate and treat  -     XR Shoulder 2+ View Left; Future  -     XR Chest 2 View; Future  -     acetaminophen (TYLENOL) 500 MG tablet; Take 1 tablet by mouth Every 6 (Six) Hours As Needed for Mild Pain for up to 90 days.  Dispense: 90 tablet; Refill: 2    2. Mixed hyperlipidemia  Assessment & Plan:  Lipid abnormalities are unchanged.  Nutritional counseling was provided. and Pharmacotherapy as ordered.  Lipids will be reassessed in 6 months.    Discussed the importance of healthy diet, nutrition, and lifestyle. Recommend low salt, fat/cholesterol diet and avoid concentrated sweets. Encouraged DASH diet along with fresh fruits & vegetables and low fat dairy products. Counseled patient to exercise/walk as tolerated. Avoid tobacco and alcohol use.        3. Neck pain  -     Ambulatory Referral to Physical Therapy Evaluate and treat  -     XR Spine Cervical 2 or 3 View; Future  -     baclofen (LIORESAL) 10 MG tablet; Take 1 tablet by mouth At Night As Needed for Muscle Spasms for up to 90 days.  Dispense: 30 tablet; Refill: 2  -     acetaminophen (TYLENOL) 500 MG tablet; Take 1 tablet by mouth Every 6 (Six) Hours As Needed for Mild Pain for up to 90 days.  Dispense: 90 tablet; Refill: 2    4. Primary hypertension  Assessment & Plan:  Hypertension is unchanged.  Continue current treatment regimen.  Dietary sodium restriction.  Continue current medications.  Blood pressure will be reassessed at the next regular appointment.      5. Loud snoring  -      Ambulatory Referral to Sleep Medicine    6. Primary osteoarthritis of right shoulder  -     Ambulatory Referral to Orthopedic Surgery             Follow Up   No follow-ups on file.  Patient was given instructions and counseling regarding his condition or for health maintenance advice. Please see specific information pulled into the AVS if appropriate.

## 2023-08-08 NOTE — ASSESSMENT & PLAN NOTE
Counseled patient on avoiding excessive bending, lifting, or using the shoulder aggressively until further evaluation and treatment of shoulder and neck pain.  Patient voiced understanding.  Avoid NSAID use due to history of hypertension.  Tylenol as needed for pain.

## 2023-08-09 ENCOUNTER — HOSPITAL ENCOUNTER (OUTPATIENT)
Dept: GENERAL RADIOLOGY | Facility: HOSPITAL | Age: 48
Discharge: HOME | End: 2023-08-09
Payer: COMMERCIAL

## 2023-08-09 DIAGNOSIS — M25.512 LEFT SHOULDER PAIN, UNSPECIFIED CHRONICITY: ICD-10-CM

## 2023-08-09 DIAGNOSIS — M54.2 NECK PAIN: Primary | ICD-10-CM

## 2023-08-09 DIAGNOSIS — M54.2 NECK PAIN: ICD-10-CM

## 2023-08-09 PROCEDURE — 72040 X-RAY EXAM NECK SPINE 2-3 VW: CPT

## 2023-08-09 PROCEDURE — 71046 X-RAY EXAM CHEST 2 VIEWS: CPT

## 2023-08-09 PROCEDURE — 73030 X-RAY EXAM OF SHOULDER: CPT

## 2023-08-09 NOTE — PROGRESS NOTES
Chest x-ray and left shoulder x-ray were both totally negative.  However there were degenerative changes in the x-ray of the neck.      The changes in the neck area probably due to excessive activity and use of the neck and more related to arthritis.  Avoiding excessive neck use would definitely help.    Will recommend follow-up with neurosurgery for evaluation and possible MRI.  Also recommend Dr. Mack Jaime chiropractor on [address] [phone].

## 2023-08-11 NOTE — PROGRESS NOTES
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  States the pain has not gone away has been doing some ibuprofen and it is still but really bothering him he does have some night pain symptoms are moderate intermittent aching worse with activity past medical history is marked for hypertension      Allergies: No Known Allergies    Medications:   Home Medications:  Current Outpatient Medications on File Prior to Visit   Medication Sig    acetaminophen (TYLENOL) 500 MG tablet Take 1 tablet by mouth Every 6 (Six) Hours As Needed for Mild Pain for up to 90 days.    atorvastatin (LIPITOR) 10 MG tablet TAKE 1 TABLET BY MOUTH DAILY    baclofen (LIORESAL) 10 MG tablet Take 1 tablet by mouth At Night As Needed for Muscle Spasms for up to 90 days.     No current facility-administered medications on file prior to visit.     Current Medications:  Scheduled Meds:  Continuous Infusions:No current facility-administered medications for this visit.    PRN Meds:.    Past Medical History:   Diagnosis Date    Hypertension     Osteoarthritis of right shoulder 08/08/2023    Moderate at AC joint         Past Surgical History:   Procedure Laterality Date    VASECTOMY          Social History     Occupational History     Employer: [employer]   Tobacco Use    Smoking status: Never    Smokeless tobacco: Never   Vaping Use    Vaping Use: Never used   Substance and Sexual Activity    Alcohol use: Not Currently    Drug  "use: Not Currently    Sexual activity: Defer      Social History     Social History Narrative    Not on file        Family History   Problem Relation Age of Onset    Lung cancer Mother     Diabetes Father     Leukemia Brother     Scoliosis Daughter     Allergies Daughter     Scoliosis Daughter     Scoliosis Daughter     Scoliosis Son              Review of Systems:     Review of Systems      Physical Exam: 48 y.o. male  General Appearance:    Alert, cooperative, in no acute distress                   Vitals:    08/14/23 1610   Temp: 97.6 øF (36.4 øC)   Weight: 101 kg (222 lb)   Height: 170.2 cm (67\")   PainSc:   6      Patient is alert and read x3 no acute distress appears her above-listed at height weight and age.  Affect is normal respiratory rate is normal unlabored. Heart rate regular rate rhythm, sclera, dentition and hearing are normal for the purpose of this exam.    Ortho Exam  Physical exam of the left shoulder reveals no overlying skin changes no lymphedema no lymphadenopathy.  Patient has active flexion 180 with mild symptoms abduction is similar external rotation is to 50 and internal rotation to the upper lumbar spine with mild symptoms.  Patient has good rotator cuff strength 4+ over 5 with isometric strength testing with pain.  Patient has a positive impingement and a positive Reina sign.  Patient has good cervical range of motion which is full and asymptomatic no radicular symptoms.  Patient has a normal elbow exam.  Good distal pulses are presentPatient has pain with overhead activity and a positive Neer sign and a positive empty can sign  They have a positive drop arm any definitive painful arc   Large Joint Arthrocentesis: L subacromial bursa  Date/Time: 8/14/2023 4:41 PM  Consent given by: patient  Site marked: site marked  Timeout: Immediately prior to procedure a time out was called to verify the correct patient, procedure, equipment, support staff and site/side marked as required "   Supporting Documentation  Indications: pain   Procedure Details  Location: shoulder - L subacromial bursa  Preparation: Patient was prepped and draped in the usual sterile fashion  Needle gauge: 21 G.  Approach: posterior  Medications administered: 2 mL lidocaine PF 1% 1 %; 1 mg methylPREDNISolone acetate 80 MG/ML  Patient tolerance: patient tolerated the procedure well with no immediate complications            Radiology:   AP, Scapular Y and Axillary Lateral of the left shoulder were ordered/reviewed to evauate shoulder pain.  I have no comparative films he does have some narrowing of his acromioclavicular joint otherwise no acute bony pathology  Imaging Results (Most Recent)       Procedure Component Value Units Date/Time    XR Shoulder 2+ View Left [[id number]] Resulted: 08/14/23 1601     Updated: 08/14/23 1601    Impression:      Ordering physician's impression is located in the Encounter Note dated 08/14/23. X-ray performed in the DR room.          Assessment/Plan: Left shoulder pain I think this is more impingement I would also be a little worried about SLAP pathology plan is to proceed with a subacromial injection as a diagnostic and therapeutic tool I did talk to him about things to do as far as rotator cuff strengthening stretching his anterior chest and essentially putting him on home exercise program.  I will give him ibuprofen 800 he generally will take that twice a day.  We did talk about strict precautions.  I will see him back in 3 weeks for reexam if he is still not better we would consider other means of testing.  I encouraged him to discuss this with his supervisors and formally establish this is a work comp injury.  I told him it did not make any difference to me but it might protect him in the future  Cortisone Injection. See procedure note.  Cortisone Injection for DIAGNOSTIC and THERAPUTIC purposes.

## 2023-08-14 ENCOUNTER — OFFICE VISIT (OUTPATIENT)
Dept: ORTHOPEDIC SURGERY | Facility: CLINIC | Age: 48
End: 2023-08-14
Payer: COMMERCIAL

## 2023-08-14 VITALS — BODY MASS INDEX: 34.84 KG/M2 | TEMPERATURE: 97.6 F | WEIGHT: 222 LBS | HEIGHT: 67 IN

## 2023-08-14 DIAGNOSIS — M75.42 IMPINGEMENT SYNDROME OF LEFT SHOULDER: ICD-10-CM

## 2023-08-14 DIAGNOSIS — R52 PAIN: Primary | ICD-10-CM

## 2023-08-14 DIAGNOSIS — I10 ESSENTIAL HYPERTENSION: ICD-10-CM

## 2023-08-14 RX ORDER — IBUPROFEN 800 MG/1
800 TABLET ORAL 3 TIMES DAILY
Qty: 90 TABLET | Refills: 0 | Status: SHIPPED | OUTPATIENT
Start: 2023-08-14

## 2023-08-14 RX ORDER — AMLODIPINE BESYLATE 5 MG/1
5 TABLET ORAL DAILY
Qty: 90 TABLET | Refills: 1 | Status: SHIPPED | OUTPATIENT
Start: 2023-08-14

## 2023-08-14 RX ADMIN — METHYLPREDNISOLONE ACETATE 1 MG: 80 INJECTION, SUSPENSION INTRA-ARTICULAR; INTRALESIONAL; INTRAMUSCULAR; SOFT TISSUE at 16:41

## 2023-08-14 RX ADMIN — LIDOCAINE HYDROCHLORIDE 2 ML: 10 INJECTION, SOLUTION EPIDURAL; INFILTRATION; INTRACAUDAL; PERINEURAL at 16:41

## 2023-08-15 RX ORDER — METHYLPREDNISOLONE ACETATE 80 MG/ML
1 INJECTION, SUSPENSION INTRA-ARTICULAR; INTRALESIONAL; INTRAMUSCULAR; SOFT TISSUE
Status: COMPLETED | OUTPATIENT
Start: 2023-08-14 | End: 2023-08-14

## 2023-08-15 RX ORDER — LIDOCAINE HYDROCHLORIDE 10 MG/ML
2 INJECTION, SOLUTION EPIDURAL; INFILTRATION; INTRACAUDAL; PERINEURAL
Status: COMPLETED | OUTPATIENT
Start: 2023-08-14 | End: 2023-08-14

## 2023-08-17 ENCOUNTER — TELEPHONE (OUTPATIENT)
Dept: PHYSICAL THERAPY | Facility: CLINIC | Age: 48
End: 2023-08-17

## 2023-08-24 DIAGNOSIS — M75.42 IMPINGEMENT SYNDROME OF LEFT SHOULDER: Primary | ICD-10-CM

## 2023-09-10 DIAGNOSIS — E78.2 MIXED HYPERLIPIDEMIA: ICD-10-CM

## 2023-09-11 RX ORDER — ATORVASTATIN CALCIUM 10 MG/1
10 TABLET, FILM COATED ORAL DAILY
Qty: 90 TABLET | Refills: 0 | Status: SHIPPED | OUTPATIENT
Start: 2023-09-11

## 2023-09-20 ENCOUNTER — OFFICE VISIT (OUTPATIENT)
Dept: FAMILY MEDICINE CLINIC | Facility: CLINIC | Age: 48
End: 2023-09-20
Payer: COMMERCIAL

## 2023-09-20 VITALS
HEART RATE: 78 BPM | OXYGEN SATURATION: 96 % | WEIGHT: 224 LBS | DIASTOLIC BLOOD PRESSURE: 78 MMHG | TEMPERATURE: 97 F | RESPIRATION RATE: 18 BRPM | HEIGHT: 67 IN | BODY MASS INDEX: 35.16 KG/M2 | SYSTOLIC BLOOD PRESSURE: 130 MMHG

## 2023-09-20 DIAGNOSIS — M25.512 CHRONIC LEFT SHOULDER PAIN: Primary | ICD-10-CM

## 2023-09-20 DIAGNOSIS — G89.29 CHRONIC LEFT SHOULDER PAIN: Primary | ICD-10-CM

## 2023-09-20 DIAGNOSIS — M54.2 NECK PAIN: ICD-10-CM

## 2023-09-20 DIAGNOSIS — M19.011 OSTEOARTHRITIS OF RIGHT SHOULDER, UNSPECIFIED OSTEOARTHRITIS TYPE: ICD-10-CM

## 2023-09-20 NOTE — PROGRESS NOTES
"Chief Complaint  Follow-up (Pt states yearly check up due)    Subjective        Eddie Landa presents to [facility]  History of Present Illness  48-year-old male who typically follows nurse practitioner Paradise Garcia for primary care here for follow-up his chronic left shoulder pain that started around March of this year.  Patient also here to discuss his neck pain.  Patient states he is currently doing physical therapy and has seen orthopedic surgeon status post injection to his left shoulder with some improvement.  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  Discussed the x-ray findings of negative x-ray of the left shoulder however patient concerned about undetected findings on x-ray as x-ray only detects bone abnormalities and does not  soft tissue abnormalities.    Objective   Vital Signs:  /78 (BP Location: Right arm, Patient Position: Sitting, Cuff Size: Adult)   Pulse 78   Temp 97 °F (36.1 °C) (Infrared)   Resp 18   Ht 170.2 cm (67\")   Wt 102 kg (224 lb)   SpO2 96%   BMI 35.08 kg/m²   Estimated body mass index is 35.08 kg/m² as calculated from the following:    Height as of this encounter: 170.2 cm (67\").    Weight as of this encounter: 102 kg (224 lb).               Physical Exam  Constitutional:       Appearance: Normal appearance.   HENT:      Head: Normocephalic and atraumatic.   Eyes:      Conjunctiva/sclera: Conjunctivae normal.   Cardiovascular:      Rate and Rhythm: Normal rate and regular rhythm.      Heart sounds: Normal heart sounds.   Pulmonary:      Effort: Pulmonary effort is normal.      Breath sounds: Normal breath sounds.   Abdominal:      General: Bowel sounds are normal.      Palpations: Abdomen is soft.      Comments: Non-tender   Musculoskeletal:      Comments: Pain with shoulder range of motion in certain directions.  Also pain on " palpation of the left AC joint of the left shoulder.  Small palpable knot close to the left AC joint under the skin.   Skin:     General: Skin is warm.   Neurological:      General: No focal deficit present.      Mental Status: He is alert and oriented to person, place, and time.   Psychiatric:         Mood and Affect: Mood normal.         Behavior: Behavior normal.      Result Review :  The following data was reviewed by: MARY Varela on 09/20/2023:  Common labs          4/24/2023    15:25   Common Labs   Glucose 91    BUN 17    Creatinine 1.15    Sodium 140    Potassium 4.3    Chloride 103    Calcium 9.2    Total Protein 7.2    Albumin 4.4    Total Bilirubin <0.2    Alkaline Phosphatase 93    AST (SGOT) 20    ALT (SGPT) 19    WBC 7.1    Hemoglobin 13.7    Hematocrit 39.8    Platelets 346    Total Cholesterol 187    Triglycerides 230    HDL Cholesterol 37    LDL Cholesterol  110    PSA 0.4      Data reviewed : Radiologic studies negative left shoulder x-ray mild arthritic changes in the neck on x-ray.  Reviewed consult note by Dr. Lucille Pantoja stating possible shoulder impingement and is status post steroid injection.             Assessment and Plan   Diagnoses and all orders for this visit:    1. Chronic left shoulder pain (Primary)  -     MRI Shoulder Left Without Contrast; Future    2. Neck pain    3. Osteoarthritis of right shoulder, unspecified osteoarthritis type    Continue physical therapy for now instructed patient to complete MRI and also have future follow-up appointment with orthopedic surgeon, patient voiced understanding    Counseled patient at length on avoiding prolonged hours of work and have at least 1 day off per week as it would cause joint problems over time.  However patient states she has to pay bills and cannot afford to take time off and does not want to take FMLA either.         Follow Up   Return in about 4 weeks (around 10/18/2023).  Patient was given instructions and  counseling regarding his condition or for health maintenance advice. Please see specific information pulled into the AVS if appropriate.

## 2023-10-12 DIAGNOSIS — I10 ESSENTIAL HYPERTENSION: ICD-10-CM

## 2023-10-13 RX ORDER — AMLODIPINE BESYLATE 5 MG/1
5 TABLET ORAL DAILY
Qty: 90 TABLET | Refills: 1 | Status: SHIPPED | OUTPATIENT
Start: 2023-10-13

## 2023-10-18 ENCOUNTER — HOSPITAL ENCOUNTER (OUTPATIENT)
Dept: MRI IMAGING | Facility: HOSPITAL | Age: 48
Discharge: HOME | End: 2023-10-18
Admitting: STUDENT IN AN ORGANIZED HEALTH CARE EDUCATION/TRAINING PROGRAM
Payer: COMMERCIAL

## 2023-10-18 DIAGNOSIS — G89.29 CHRONIC LEFT SHOULDER PAIN: ICD-10-CM

## 2023-10-18 DIAGNOSIS — M25.512 CHRONIC LEFT SHOULDER PAIN: ICD-10-CM

## 2023-10-18 PROCEDURE — 73221 MRI JOINT UPR EXTREM W/O DYE: CPT

## 2023-10-26 NOTE — PROGRESS NOTES
Shoulder MRI Follow Up      Patient: Eddie Landa        YOB: 1975            Chief Complaints: Shoulder pain left      History of Present Illness: The patient is here follow-up of an MRI of the shoulder MRI demonstrates acromioclavicular inflammation no other acute pathology.  The last shot I did did help some he does still have some pain part of which is localized over the acromioclavicular joint      Physical Exam: 48 y.o. male  General Appearance:    Alert, cooperative, in no acute distress                 There were no vitals filed for this visit.     Patient is alert and read ×3 no acute distress appears her above-listed at height weight and age.  Affect is normal respiratory rate is normal unlabored. Heart rate regular rate rhythm, sclera, dentition and hearing are normal for the purpose of this exam.      Ortho Exam  Physical exam of the left shoulder reveals no overlying skin changes no lymphedema no lymphadenopathy.  Patient has active flexion 180 with mild symptoms abduction is similar external rotation is to 50 and internal rotation to the upper lumbar spine with mild symptoms.  Patient has good rotator cuff strength 4+ over 5 with isometric strength testing with pain.  Patient has a positive impingement and a positive Reina sign.  Patient has good cervical range of motion which is full and asymptomatic no radicular symptoms.  Patient has a normal elbow exam.  Good distal pulses are presentPatient has pain with overhead activity and a positive Neer sign and a positive empty can sign  They have a positive drop arm any definitive painful arc   He does have palpable tenderness over the acromioclavicular joint and pain with horizontal adduction  MRI Results: MRIs as above have reviewed and agree  Medium Joint Arthrocentesis: L acromioclavicular  Date/Time: 10/30/2023 3:46 PM  Consent given by: patient  Site marked: site marked  Timeout: Immediately prior to procedure a time out was called to  verify the correct patient, procedure, equipment, support staff and site/side marked as required   Supporting Documentation  Indications: pain and diagnostic evaluation   Procedure Details  Location: shoulder - L acromioclavicular  Preparation: Patient was prepped and draped in the usual sterile fashion  Needle size: 22 G  Approach: superior  Medications administered: 80 mg methylPREDNISolone acetate 80 MG/ML; 2 mL lidocaine PF 1% 1 %  Patient tolerance: patient tolerated the procedure well with no immediate complications        Assessment/Plan:      Left shoulder pain he did get some relief from the subacromial injection I do think part of this is acromioclavicular plan is to proceed with an isolated injection to that joint.  We talked about potential future surgical intervention but hopefully we can resolve this without anything surgical

## 2023-10-30 ENCOUNTER — OFFICE VISIT (OUTPATIENT)
Dept: ORTHOPEDIC SURGERY | Facility: CLINIC | Age: 48
End: 2023-10-30
Payer: COMMERCIAL

## 2023-10-30 VITALS — HEIGHT: 67 IN | TEMPERATURE: 97.8 F | BODY MASS INDEX: 35.63 KG/M2 | WEIGHT: 227 LBS

## 2023-10-30 DIAGNOSIS — M19.012 ARTHRITIS OF LEFT ACROMIOCLAVICULAR JOINT: Primary | ICD-10-CM

## 2023-10-30 PROCEDURE — 20605 DRAIN/INJ JOINT/BURSA W/O US: CPT | Performed by: ORTHOPAEDIC SURGERY

## 2023-10-30 PROCEDURE — 99213 OFFICE O/P EST LOW 20 MIN: CPT | Performed by: ORTHOPAEDIC SURGERY

## 2023-10-30 RX ADMIN — LIDOCAINE HYDROCHLORIDE 2 ML: 10 INJECTION, SOLUTION EPIDURAL; INFILTRATION; INTRACAUDAL; PERINEURAL at 15:46

## 2023-10-30 RX ADMIN — METHYLPREDNISOLONE ACETATE 80 MG: 80 INJECTION, SUSPENSION INTRA-ARTICULAR; INTRALESIONAL; INTRAMUSCULAR; SOFT TISSUE at 15:46

## 2023-10-31 ENCOUNTER — OFFICE VISIT (OUTPATIENT)
Dept: FAMILY MEDICINE CLINIC | Facility: CLINIC | Age: 48
End: 2023-10-31
Payer: COMMERCIAL

## 2023-10-31 VITALS
BODY MASS INDEX: 35.88 KG/M2 | HEIGHT: 67 IN | HEART RATE: 63 BPM | DIASTOLIC BLOOD PRESSURE: 84 MMHG | TEMPERATURE: 98.9 F | OXYGEN SATURATION: 98 % | WEIGHT: 228.6 LBS | SYSTOLIC BLOOD PRESSURE: 130 MMHG

## 2023-10-31 DIAGNOSIS — M25.512 CHRONIC LEFT SHOULDER PAIN: ICD-10-CM

## 2023-10-31 DIAGNOSIS — Z12.11 SCREEN FOR COLON CANCER: Primary | ICD-10-CM

## 2023-10-31 DIAGNOSIS — G89.29 CHRONIC LEFT SHOULDER PAIN: ICD-10-CM

## 2023-10-31 DIAGNOSIS — E78.2 MIXED HYPERLIPIDEMIA: ICD-10-CM

## 2023-10-31 DIAGNOSIS — I10 PRIMARY HYPERTENSION: ICD-10-CM

## 2023-10-31 RX ORDER — LIDOCAINE HYDROCHLORIDE 10 MG/ML
2 INJECTION, SOLUTION EPIDURAL; INFILTRATION; INTRACAUDAL; PERINEURAL
Status: COMPLETED | OUTPATIENT
Start: 2023-10-30 | End: 2023-10-30

## 2023-10-31 RX ORDER — METHYLPREDNISOLONE ACETATE 80 MG/ML
80 INJECTION, SUSPENSION INTRA-ARTICULAR; INTRALESIONAL; INTRAMUSCULAR; SOFT TISSUE
Status: COMPLETED | OUTPATIENT
Start: 2023-10-30 | End: 2023-10-30

## 2023-10-31 NOTE — PROGRESS NOTES
"Chief Complaint  Chronic left shoulder pain (4 week follow up/Cortisone shot yesterday-doing well/) and h. pylori (Wife diagnosed-told to get checked/)    Subjective        Eddie Landa presents to [facility]  History of Present Illness  Patient presents to the office today for chronic left shoulder pain. He is stable today follows ortho  HTN controlled today   HLD working on [employer] diet and exerciswe  Blood pressure today is 130/84  He is due for colonoscopy       Objective   Vital Signs:  /84 (BP Location: Left arm, Patient Position: Sitting, Cuff Size: Adult)   Pulse 63   Temp 98.9 °F (37.2 °C) (Temporal)   Ht 170.2 cm (67.01\")   Wt 104 kg (228 lb 9.6 oz)   SpO2 98%   BMI 35.80 kg/m²   Estimated body mass index is 35.8 kg/m² as calculated from the following:    Height as of this encounter: 170.2 cm (67.01\").    Weight as of this encounter: 104 kg (228 lb 9.6 oz).               Physical Exam  Constitutional:       General: He is not in acute distress.     Appearance: Normal appearance.   HENT:      Head: Normocephalic.   Eyes:      Pupils: Pupils are equal, round, and reactive to light.   Cardiovascular:      Rate and Rhythm: Normal rate.      Pulses: Normal pulses.      Heart sounds: Normal heart sounds.   Pulmonary:      Effort: Pulmonary effort is normal.      Breath sounds: Normal breath sounds.   Musculoskeletal:         General: Tenderness present. Normal range of motion.      Left shoulder: Tenderness present. Decreased range of motion.      Cervical back: Normal range of motion and neck supple.   Skin:     General: Skin is warm.   Neurological:      General: No focal deficit present.      Mental Status: He is alert and oriented to person, place, and time.   Psychiatric:         Mood and Affect: Mood normal.         Behavior: Behavior normal.         Thought Content: Thought content normal.         Judgment: Judgment normal.        Result Review :                 "   Assessment and Plan   Diagnoses and all orders for this visit:    1. Screen for colon cancer (Primary)  -     Ambulatory Referral For Screening Colonoscopy    2. Mixed hyperlipidemia  -     Comprehensive metabolic panel; Future  -     Lipid panel; Future    3. Primary hypertension  -     Comprehensive metabolic panel; Future  -     Lipid panel; Future    4. Chronic left shoulder pain      Chronic shoulder pain follows ortho for injections stable    HLD Discussed with patient to avoid fried fatty foods, eat a healthy well-balanced diet, frequent aerobic exercise, take medication as prescribed, and return to office for lab follow-up.      HTN  Disussed with patient the importance of maintaining a normal BMI.  Reviewed the Dash diet, dietary sodium restriction.  Encourage the patient to engage in 30 minutes of regular aerobic physical activity at least 3 days a week.  Limit alcohol consumption to no more than 2 drinks per day for men, 1 drink per day for women.  Patient voiced understanding all questions answered.               Follow Up   Return in about 6 months (around 4/30/2024) for Annual physical.  Patient was given instructions and counseling regarding his condition or for health maintenance advice. Please see specific information pulled into the AVS if appropriate.

## 2023-11-01 DIAGNOSIS — I10 PRIMARY HYPERTENSION: ICD-10-CM

## 2023-11-01 DIAGNOSIS — E78.2 MIXED HYPERLIPIDEMIA: ICD-10-CM

## 2023-11-02 PROBLEM — Z12.11 SCREEN FOR COLON CANCER: Status: ACTIVE | Noted: 2022-04-19

## 2023-11-02 PROBLEM — G89.29 CHRONIC RIGHT SHOULDER PAIN: Status: ACTIVE | Noted: 2022-11-16

## 2023-11-02 LAB
ALBUMIN SERPL-MCNC: 4.8 G/DL (ref 4.1–5.1)
ALBUMIN/GLOB SERPL: 1.9 {RATIO} (ref 1.2–2.2)
ALP SERPL-CCNC: 97 IU/L (ref 44–121)
ALT SERPL-CCNC: 13 IU/L (ref 0–44)
AST SERPL-CCNC: 16 IU/L (ref 0–40)
BILIRUB SERPL-MCNC: 0.2 MG/DL (ref 0–1.2)
BUN SERPL-MCNC: 18 MG/DL (ref 6–24)
BUN/CREAT SERPL: 14 (ref 9–20)
CALCIUM SERPL-MCNC: 9.4 MG/DL (ref 8.7–10.2)
CHLORIDE SERPL-SCNC: 103 MMOL/L (ref 96–106)
CHOLEST SERPL-MCNC: 163 MG/DL (ref 100–199)
CO2 SERPL-SCNC: 27 MMOL/L (ref 20–29)
CREAT SERPL-MCNC: 1.25 MG/DL (ref 0.76–1.27)
EGFRCR SERPLBLD CKD-EPI 2021: 71 ML/MIN/1.73
GLOBULIN SER CALC-MCNC: 2.5 G/DL (ref 1.5–4.5)
GLUCOSE SERPL-MCNC: 79 MG/DL (ref 70–99)
HDLC SERPL-MCNC: 42 MG/DL
LDLC SERPL CALC-MCNC: 95 MG/DL (ref 0–99)
POTASSIUM SERPL-SCNC: 4.5 MMOL/L (ref 3.5–5.2)
PROT SERPL-MCNC: 7.3 G/DL (ref 6–8.5)
SODIUM SERPL-SCNC: 143 MMOL/L (ref 134–144)
TRIGL SERPL-MCNC: 146 MG/DL (ref 0–149)
VLDLC SERPL CALC-MCNC: 26 MG/DL (ref 5–40)

## 2023-11-20 ENCOUNTER — TELEPHONE (OUTPATIENT)
Dept: GASTROENTEROLOGY | Facility: CLINIC | Age: 48
End: 2023-11-20
Payer: COMMERCIAL

## 2023-11-20 NOTE — TELEPHONE ENCOUNTER
NO PERSONAL HX OF POLYPS    NO FAMILY HX OF POLYPS    NO FAMILY HX OF COLON CA            LIST OF  MEDICATIONS      ATORVASTATIN  AMLODIPINE  ACETAMINOPHEN  IBUPROFEN                      OA QUESTIONNAIRE SCANNED IN MEDIA

## 2023-11-27 ENCOUNTER — OFFICE VISIT (OUTPATIENT)
Dept: SLEEP MEDICINE | Facility: HOSPITAL | Age: 48
End: 2023-11-27
Payer: COMMERCIAL

## 2023-11-27 VITALS
OXYGEN SATURATION: 97 % | SYSTOLIC BLOOD PRESSURE: 150 MMHG | HEART RATE: 74 BPM | BODY MASS INDEX: 35.12 KG/M2 | HEIGHT: 67 IN | WEIGHT: 223.8 LBS | DIASTOLIC BLOOD PRESSURE: 82 MMHG

## 2023-11-27 DIAGNOSIS — R06.83 LOUD SNORING: Primary | ICD-10-CM

## 2023-11-27 DIAGNOSIS — G47.10 HYPERSOMNIA WITH SLEEP APNEA: ICD-10-CM

## 2023-11-27 DIAGNOSIS — G47.30 HYPERSOMNIA WITH SLEEP APNEA: ICD-10-CM

## 2023-11-27 DIAGNOSIS — R53.83 FATIGUE, UNSPECIFIED TYPE: ICD-10-CM

## 2023-11-27 DIAGNOSIS — G47.69 NOCTURNAL MYOCLONUS: ICD-10-CM

## 2023-11-27 PROBLEM — N39.44 NOCTURNAL AND DIURNAL ENURESIS: Status: ACTIVE | Noted: 2023-11-27

## 2023-11-27 PROCEDURE — G0463 HOSPITAL OUTPT CLINIC VISIT: HCPCS

## 2023-11-27 PROCEDURE — 99204 OFFICE O/P NEW MOD 45 MIN: CPT | Performed by: PSYCHIATRY & NEUROLOGY

## 2023-11-27 NOTE — PROGRESS NOTES
Reason for Consultation: Hypersomnia        Patient Care Team:  Paradise Garcia APRN as PCP - General (Nurse Practitioner)  Chelsey Pantoja MD as Surgeon (Orthopedic Surgery)  Caty Mason MD, MPH as Consulting Physician (Sleep Medicine)      History of present illness:    Thank you for asking me to see your patient.  The patient is a 48 y.o. male is the visit with his wife.  They provide history that he is been sleepy for many years.  He snores and stops breathing.  More interestingly he has episodes of shaking every couple weeks.  His wife observes that his head shakes and he may have several such episodes during the night.  These are not associate with tongue biting nor urinary incontinence.  He does not ever have a history of seizures.    Never had a sleep study.  He goes to bed between 730 and 8 PM and gets up at 4:30 AM and estimates it takes 30 to 40 minutes to fall asleep and he is tired in the morning.  He feels drowsy during the day he has felt drowsy while he is driving he snores loudly and in all positions.  He stops breathing and has a dry mouth in the morning.  In addition to jerking head movements his legs can jerk sometimes 2.  He estimates he gets up 3 times per night to go to the bathroom and sleeping more does not seem to make his sleep any more restorative.  He also complains of neck pain, fever, swollen ankles, dizziness and he feels as though he is always cold.    History of tobacco but he does drink 2 sodas per day.  He also does not drink any alcohol.    Cromwell: 14    Data Reviewed: Reviewed his questionnaire      PMH:  Past Medical History:   Diagnosis Date    Hypertension     Osteoarthritis of right shoulder 08/08/2023    Moderate at AC joint           Allergies:  Patient has no known allergies.     Medication Review:   Current Outpatient Medications on File Prior to Visit   Medication Sig Dispense Refill    amLODIPine (NORVASC) 5 MG tablet Take 1 tablet by mouth Daily. 90 tablet  "1    atorvastatin (LIPITOR) 10 MG tablet TAKE 1 TABLET BY MOUTH DAILY 90 tablet 0    ibuprofen (ADVIL,MOTRIN) 800 MG tablet Take 1 tablet by mouth 3 (Three) Times a Day. 90 tablet 0     No current facility-administered medications on file prior to visit.         Vital Signs:    Vitals:    11/27/23 1502   BP: 150/82   Pulse: 74   SpO2: 97%   Weight: 102 kg (223 lb 12.8 oz)   Height: 170.2 cm (67\")        Body mass index is 35.05 kg/m².  Neck Circumference: 15 inches      Physical Exam:    Constitutional:  Well developed 48 y.o. male that appears in no apparent distress.  Awake & oriented times 3.  Normal mood with normal recent and remote memory and normal judgement.  Eyes:  Conjunctivae normal.  Oropharynx: Moist mucous membranes without exudate and Mallampati 4  Neck: Trachea midline  Respiratory: Effort is not labored  Cardiovascular: Radial pulse regular  Musculoskeletal: Gait appears normal, no digital clubbing evident, no pre-tibial edema        Impression:   Encounter Diagnoses   Name Primary?    Loud snoring Yes    Fatigue, unspecified type     Nocturnal myoclonus     Hypersomnia with sleep apnea      Obesity class II patient's BMI is Body mass index is 35.05 kg/m².    Patient almost certainly has sleep disordered breathing and I am not sure what these parasomnia events are with shaking of his head.  Because the patient can be aroused and he does not have tongue biting or urinary incontinence it seems less likely to be seizure.  Because of the history of go to do an in lab study with a insomnia protocol.    Plan:  The patient should practice good sleep hygiene measures.      Weight loss might be beneficial in this patient who has a Body mass index is 35.05 kg/m².      Pathophysiology of TOO described to the patient.  Cardiovascular complications of untreated TOO also reviewed.      The patient was cautioned about the dangers of drowsy driving.    Jimenez Mason MD  Sleep Medicine  11/27/23  15:33 " EST

## 2023-11-30 ENCOUNTER — TELEPHONE (OUTPATIENT)
Dept: GASTROENTEROLOGY | Facility: CLINIC | Age: 48
End: 2023-11-30
Payer: COMMERCIAL

## 2023-11-30 DIAGNOSIS — Z12.11 ENCOUNTER FOR SCREENING FOR MALIGNANT NEOPLASM OF COLON: Primary | ICD-10-CM

## 2023-11-30 RX ORDER — SODIUM CHLORIDE, SODIUM LACTATE, POTASSIUM CHLORIDE, CALCIUM CHLORIDE 600; 310; 30; 20 MG/100ML; MG/100ML; MG/100ML; MG/100ML
30 INJECTION, SOLUTION INTRAVENOUS CONTINUOUS
OUTPATIENT
Start: 2023-11-30

## 2023-12-01 ENCOUNTER — TELEPHONE (OUTPATIENT)
Dept: GASTROENTEROLOGY | Facility: CLINIC | Age: 48
End: 2023-12-01
Payer: COMMERCIAL

## 2023-12-04 ENCOUNTER — TELEPHONE (OUTPATIENT)
Dept: GASTROENTEROLOGY | Facility: CLINIC | Age: 48
End: 2023-12-04

## 2023-12-04 ENCOUNTER — TELEPHONE (OUTPATIENT)
Dept: GASTROENTEROLOGY | Facility: CLINIC | Age: 48
End: 2023-12-04
Payer: COMMERCIAL

## 2023-12-04 PROBLEM — Z12.11 ENCOUNTER FOR SCREENING FOR MALIGNANT NEOPLASM OF COLON: Status: ACTIVE | Noted: 2023-11-30

## 2023-12-04 NOTE — TELEPHONE ENCOUNTER
Hub staff attempted to follow warm transfer process and was unsuccessful     Caller: Eddie Landa    Relationship to patient: Self    Best call back number: [phone]    Patient is needing: RETURNING MISSED CALL TO SCHEDULE SCOPE/ PT STATES HE GETS OFF WORK AT 2PM PLEASE CONTACT PT AFTER 2PM DAILY.

## 2023-12-04 NOTE — TELEPHONE ENCOUNTER
Hub staff attempted to follow warm transfer process and was unsuccessful     Caller: Eddie Landa    Relationship to patient: Self    Best call back number: [phone]    Patient is needing: RETURNING MISSED CALL TO SCHEDULE SCOPE/ PT STATES HE GETS OFF WORK AT 2PM PLEASE CONTACT PT AFTER 2PM DAILY.

## 2023-12-06 DIAGNOSIS — G47.10 HYPERSOMNIA WITH SLEEP APNEA: Primary | ICD-10-CM

## 2023-12-06 DIAGNOSIS — G47.30 HYPERSOMNIA WITH SLEEP APNEA: Primary | ICD-10-CM

## 2023-12-26 NOTE — PROGRESS NOTES
Patient: Eddie Landa  YOB: 1975  Date of Service: 12/26/2023    Chief Complaints: Left shoulder pain    Subjective:    History of Present Illness: Pt is seen in the office today with complaints of left shoulder pain he has an MRI which demonstrates some acromioclavicular arthritis he does have evidence of impingement as well clinically and radiographically.  I injected his acromioclavicular joint last time he did get some relief he is starting to hurt again we could try subacromial.  At some point at the turn of the year I think it is reasonable to proceed with arthroscopy he just states he cannot do it right now.  He is currently having to work 7 days a week which also so think is hard on his shoulder I am willing to give him some FMLA to at least give him a day of rest        Allergies: No Known Allergies    Medications:   Home Medications:  Current Outpatient Medications on File Prior to Visit   Medication Sig    amLODIPine (NORVASC) 5 MG tablet Take 1 tablet by mouth Daily.    atorvastatin (LIPITOR) 10 MG tablet TAKE 1 TABLET BY MOUTH DAILY    ibuprofen (ADVIL,MOTRIN) 800 MG tablet Take 1 tablet by mouth 3 (Three) Times a Day.     No current facility-administered medications on file prior to visit.     Current Medications:  Scheduled Meds:  Continuous Infusions:No current facility-administered medications for this visit.    PRN Meds:.    I have reviewed the patient's medical history in detail and updated the computerized patient record.  Review and summarization of old records include:    Past Medical History:   Diagnosis Date    Hypertension     Osteoarthritis of right shoulder 08/08/2023    Moderate at AC joint         Past Surgical History:   Procedure Laterality Date    VASECTOMY          Social History     Occupational History     Employer: [employer]   Tobacco Use    Smoking status: Never    Smokeless tobacco: Never   Vaping Use    Vaping Use: Never used   Substance and Sexual Activity     Alcohol use: Not Currently    Drug use: Not Currently    Sexual activity: Defer      Social History     Social History Narrative    Not on file        Family History   Problem Relation Age of Onset    Lung cancer Mother     Diabetes Father     Leukemia Brother     Scoliosis Daughter     Allergies Daughter     Scoliosis Daughter     Scoliosis Daughter     Scoliosis Son        ROS: 14 point review of systems was performed and was negative except for documented findings in HPI and today's encounter.     Allergies: No Known Allergies  Constitutional:  Denies fever, shaking or chills   Eyes:  Denies change in visual acuity   HENT:  Denies nasal congestion or sore throat   Respiratory:  Denies cough or shortness of breath   Cardiovascular:  Denies chest pain or severe LE edema   GI:  Denies abdominal pain, nausea, vomiting, bloody stools or diarrhea   Musculoskeletal:  Numbness, tingling, or loss of motor function only as noted above in history of present illness.  : Denies painful urination or hematuria  Integument:  Denies rash, lesion or ulceration   Neurologic:  Denies headache or focal weakness  Endocrine:  Denies lymphadenopathy  Psych:  Denies confusion or change in mental status   Hem:  Denies active bleeding      Physical Exam: 48 y.o. male  Wt Readings from Last 3 Encounters:   11/27/23 102 kg (223 lb 12.8 oz)   10/31/23 104 kg (228 lb 9.6 oz)   10/30/23 103 kg (227 lb)       There is no height or weight on file to calculate BMI.    There were no vitals filed for this visit.  Vital signs reviewed.   General Appearance:    Alert, cooperative, in no acute distress                    Ortho exam    Physical exam of the left shoulder reveals no overlying skin changes no lymphedema no lymphadenopathy.  Patient has active flexion 180 with mild symptoms abduction is similar external rotation is to 50 and internal rotation to the upper lumbar spine with mild symptoms.  Patient has good rotator cuff strength 4+ over 5  with isometric strength testing with pain.  Patient has a positive impingement and a positive Reina sign.  Patient has good cervical range of motion which is full and asymptomatic no radicular symptoms.  Patient has a normal elbow exam.  Good distal pulses are presentPatient has pain with overhead activity and a positive Neer sign and a positive empty can sign  They have a positive drop arm any definitive painful arc              Assessment: Left shoulder pain I think he has evidence of impingement acromioclavicular Tritus think is reasonable to inject subacromial space and continue with this therapy at some point in near future probably should consider arthroscopy    Plan: as above  Follow up as indicated.  Ice, elevate, and rest as needed.  Discussed conservative measures of pain control including ice, bracing.  Also talked about the importance of strengthening   Chelsey Pantoja M.D.    Large Joint Arthrocentesis: L subacromial bursa  Date/Time: 12/28/2023 4:01 PM  Consent given by: patient  Site marked: site marked  Timeout: Immediately prior to procedure a time out was called to verify the correct patient, procedure, equipment, support staff and site/side marked as required   Supporting Documentation  Indications: pain   Procedure Details  Location: shoulder - L subacromial bursa  Preparation: Patient was prepped and draped in the usual sterile fashion  Needle gauge: 21G.  Approach: posterior  Medications administered: 80 mg methylPREDNISolone acetate 80 MG/ML; 2 mL lidocaine PF 1% 1 %  Patient tolerance: patient tolerated the procedure well with no immediate complications

## 2023-12-28 ENCOUNTER — OFFICE VISIT (OUTPATIENT)
Dept: ORTHOPEDIC SURGERY | Facility: CLINIC | Age: 48
End: 2023-12-28
Payer: COMMERCIAL

## 2023-12-28 ENCOUNTER — HOSPITAL ENCOUNTER (OUTPATIENT)
Dept: SLEEP MEDICINE | Facility: HOSPITAL | Age: 48
End: 2023-12-28
Payer: COMMERCIAL

## 2023-12-28 VITALS — HEIGHT: 67 IN | BODY MASS INDEX: 35.31 KG/M2 | WEIGHT: 225 LBS | TEMPERATURE: 98.7 F

## 2023-12-28 DIAGNOSIS — G47.30 HYPERSOMNIA WITH SLEEP APNEA: ICD-10-CM

## 2023-12-28 DIAGNOSIS — G47.10 HYPERSOMNIA WITH SLEEP APNEA: ICD-10-CM

## 2023-12-28 DIAGNOSIS — M75.42 IMPINGEMENT SYNDROME OF LEFT SHOULDER: Primary | ICD-10-CM

## 2023-12-28 PROCEDURE — 95806 SLEEP STUDY UNATT&RESP EFFT: CPT

## 2023-12-28 RX ADMIN — METHYLPREDNISOLONE ACETATE 80 MG: 80 INJECTION, SUSPENSION INTRA-ARTICULAR; INTRALESIONAL; INTRAMUSCULAR; SOFT TISSUE at 16:01

## 2023-12-28 RX ADMIN — LIDOCAINE HYDROCHLORIDE 2 ML: 10 INJECTION, SOLUTION EPIDURAL; INFILTRATION; INTRACAUDAL; PERINEURAL at 16:01

## 2023-12-29 RX ORDER — LIDOCAINE HYDROCHLORIDE 10 MG/ML
2 INJECTION, SOLUTION EPIDURAL; INFILTRATION; INTRACAUDAL; PERINEURAL
Status: COMPLETED | OUTPATIENT
Start: 2023-12-28 | End: 2023-12-28

## 2023-12-29 RX ORDER — METHYLPREDNISOLONE ACETATE 80 MG/ML
80 INJECTION, SUSPENSION INTRA-ARTICULAR; INTRALESIONAL; INTRAMUSCULAR; SOFT TISSUE
Status: COMPLETED | OUTPATIENT
Start: 2023-12-28 | End: 2023-12-28

## 2024-01-04 DIAGNOSIS — G47.10 HYPERSOMNIA WITH SLEEP APNEA: Primary | ICD-10-CM

## 2024-01-04 DIAGNOSIS — G47.30 HYPERSOMNIA WITH SLEEP APNEA: Primary | ICD-10-CM

## 2024-01-08 ENCOUNTER — TELEPHONE (OUTPATIENT)
Dept: SLEEP MEDICINE | Facility: HOSPITAL | Age: 49
End: 2024-01-08
Payer: COMMERCIAL

## 2024-01-08 NOTE — TELEPHONE ENCOUNTER
Lm on pts vm with results. Pt will need to call us back with DME of choice. Pt will also need to schedule a f/u for compliance.

## 2024-02-06 ENCOUNTER — OFFICE VISIT (OUTPATIENT)
Dept: ORTHOPEDIC SURGERY | Facility: CLINIC | Age: 49
End: 2024-02-06
Payer: COMMERCIAL

## 2024-02-06 VITALS — WEIGHT: 224.8 LBS | BODY MASS INDEX: 36.13 KG/M2 | TEMPERATURE: 98.2 F | HEIGHT: 66 IN

## 2024-02-06 DIAGNOSIS — M19.012 ARTHRITIS OF LEFT ACROMIOCLAVICULAR JOINT: ICD-10-CM

## 2024-02-06 DIAGNOSIS — M75.42 IMPINGEMENT SYNDROME OF LEFT SHOULDER: Primary | ICD-10-CM

## 2024-02-06 PROCEDURE — 99212 OFFICE O/P EST SF 10 MIN: CPT | Performed by: ORTHOPAEDIC SURGERY

## 2024-02-06 RX ORDER — BACLOFEN 10 MG/1
10 TABLET ORAL NIGHTLY PRN
COMMUNITY
Start: 2023-12-28

## 2024-02-06 NOTE — PROGRESS NOTES
"Shoulder Follow Up      Patient: Eddie Landa        YOB: 1975            Chief Complaints: Shoulder pain left      History of Present Illness: Patient is here follow-up left shoulder pain I think he has an element of impingement and definitely an element of acromioclavicular arthritis.  We have injected both and he has gotten good relief.  We talked about arthroscopy in view of the fact his symptoms continue to be recurrent he just states he cannot take off work.  This patient works very very hard he works 7 days a week and I do not think he ever is able to give his shoulders or rest.  He was asking about some FMLA filled out and I am happy to do that for this nice patient he states today he really does not want to do any injections but knows that he could do another 1 near future if he desires      Physical Exam: 49 y.o. male  General Appearance:    Alert, cooperative, in no acute distress                   Vitals:    02/06/24 1558   Temp: 98.2 °F (36.8 °C)   Weight: 102 kg (224 lb 12.8 oz)   Height: 167.6 cm (66\")   PainSc:   8        Patient is alert and read ×3 no acute distress appears her above-listed at height weight and age.  Affect is normal respiratory rate is normal unlabored. Heart rate regular rate rhythm, sclera, dentition and hearing are normal for the purpose of this exam.      Ortho Exam    Physical exam of the left shoulder reveals no overlying skin changes no lymphedema no lymphadenopathy.  Patient has active flexion 180 with mild symptoms abduction is similar external rotation is to 50 and internal rotation to the upper lumbar spine with mild symptoms.  Patient has good rotator cuff strength 4+ over 5 with isometric strength testing with pain.  Patient has a positive impingement and a positive Reina sign.  Patient has good cervical range of motion which is full and asymptomatic no radicular symptoms.  Patient has a normal elbow exam.  Good distal pulses are presentPatient has " pain with overhead activity and a positive Neer sign and a positive empty can sign  They have a positive drop arm any definitive painful arc   He does have some pain with palpation over his acromioclavicular joint        Assessment/Plan: Left shoulder pain with an element of acromioclavicular joint and impingement we talked about options she understand these going forward including arthroscopic intervention.  At this point is doing fine he cannot take off work I will fill out FMLA for him to the tune of 4 days a month he will get the paperwork to mail

## 2024-03-19 ENCOUNTER — TELEPHONE (OUTPATIENT)
Dept: GASTROENTEROLOGY | Facility: CLINIC | Age: 49
End: 2024-03-19
Payer: COMMERCIAL

## 2024-04-30 DIAGNOSIS — I10 ESSENTIAL HYPERTENSION: ICD-10-CM

## 2024-05-01 RX ORDER — AMLODIPINE BESYLATE 5 MG/1
5 TABLET ORAL DAILY
Qty: 90 TABLET | Refills: 1 | Status: SHIPPED | OUTPATIENT
Start: 2024-05-01

## 2024-11-10 DIAGNOSIS — I10 ESSENTIAL HYPERTENSION: ICD-10-CM

## 2024-11-11 RX ORDER — AMLODIPINE BESYLATE 5 MG/1
5 TABLET ORAL DAILY
Qty: 90 TABLET | Refills: 1 | OUTPATIENT
Start: 2024-11-11